# Patient Record
Sex: FEMALE | URBAN - METROPOLITAN AREA
[De-identification: names, ages, dates, MRNs, and addresses within clinical notes are randomized per-mention and may not be internally consistent; named-entity substitution may affect disease eponyms.]

---

## 2019-05-20 ENCOUNTER — APPOINTMENT (OUTPATIENT)
Dept: URBAN - METROPOLITAN AREA SURGERY 8 | Age: 39
Setting detail: DERMATOLOGY
End: 2019-05-20

## 2019-05-20 DIAGNOSIS — R23.1 PALLOR: ICD-10-CM

## 2019-05-20 DIAGNOSIS — L81.4 OTHER MELANIN HYPERPIGMENTATION: ICD-10-CM

## 2019-05-20 DIAGNOSIS — R21 RASH AND OTHER NONSPECIFIC SKIN ERUPTION: ICD-10-CM

## 2019-05-20 PROBLEM — L70.0 ACNE VULGARIS: Status: ACTIVE | Noted: 2019-05-20

## 2019-05-20 PROBLEM — D48.5 NEOPLASM OF UNCERTAIN BEHAVIOR OF SKIN: Status: ACTIVE | Noted: 2019-05-20

## 2019-05-20 PROBLEM — J30.1 ALLERGIC RHINITIS DUE TO POLLEN: Status: ACTIVE | Noted: 2019-05-20

## 2019-05-20 PROBLEM — L55.1 SUNBURN OF SECOND DEGREE: Status: ACTIVE | Noted: 2019-05-20

## 2019-05-20 PROCEDURE — OTHER ORDER TESTS: OTHER

## 2019-05-20 PROCEDURE — OTHER PRESCRIPTION: OTHER

## 2019-05-20 PROCEDURE — OTHER ADDITIONAL NOTES: OTHER

## 2019-05-20 PROCEDURE — 99203 OFFICE O/P NEW LOW 30 MIN: CPT

## 2019-05-20 PROCEDURE — OTHER COUNSELING: OTHER

## 2019-05-20 PROCEDURE — OTHER MIPS QUALITY: OTHER

## 2019-05-20 RX ORDER — DESONIDE 0.5 MG/G
CREAM TOPICAL BID
Qty: 1 | Refills: 0 | Status: ERX | COMMUNITY
Start: 2019-05-20

## 2019-05-20 ASSESSMENT — LOCATION DETAILED DESCRIPTION DERM
LOCATION DETAILED: RIGHT CENTRAL LATERAL NECK
LOCATION DETAILED: LEFT INFERIOR CENTRAL MALAR CHEEK
LOCATION DETAILED: RIGHT INFERIOR CENTRAL MALAR CHEEK

## 2019-05-20 ASSESSMENT — LOCATION SIMPLE DESCRIPTION DERM
LOCATION SIMPLE: NECK
LOCATION SIMPLE: LEFT CHEEK
LOCATION SIMPLE: RIGHT CHEEK

## 2019-05-20 ASSESSMENT — LOCATION ZONE DERM
LOCATION ZONE: NECK
LOCATION ZONE: FACE
LOCATION ZONE: FACE

## 2019-05-20 NOTE — PROCEDURE: COUNSELING
Detail Level: Detailed
Patient Specific Counseling (Will Not Stick From Patient To Patient): Lab work-up as detailed above
Detail Level: Zone

## 2019-05-20 NOTE — PROCEDURE: ADDITIONAL NOTES
Detail Level: Zone
Additional Notes: Facial rash appears to be resolving compared to pictures viewed. Unclear etiology concern for SLE given history (photosensitivity, oral ulcers, Sjogren's-like symptoms, anemia) and livedo reticularis. Discussed biopsy, but unsure if would be diagnostic at this time. Will give desonide cream BID and work-up for SLE and livedo reticularis. Re-assess in 2 weeks. Instructed to call if flares before.
Additional Notes: Will defer and re-asses in 2 weeks at f/u

## 2019-06-04 ENCOUNTER — APPOINTMENT (OUTPATIENT)
Dept: URBAN - METROPOLITAN AREA SURGERY 8 | Age: 39
Setting detail: DERMATOLOGY
End: 2019-06-04

## 2019-06-04 DIAGNOSIS — R21 RASH AND OTHER NONSPECIFIC SKIN ERUPTION: ICD-10-CM

## 2019-06-04 DIAGNOSIS — R23.1 PALLOR: ICD-10-CM

## 2019-06-04 DIAGNOSIS — L81.0 POSTINFLAMMATORY HYPERPIGMENTATION: ICD-10-CM

## 2019-06-04 DIAGNOSIS — D64.9 ANEMIA, UNSPECIFIED: ICD-10-CM

## 2019-06-04 PROCEDURE — 99213 OFFICE O/P EST LOW 20 MIN: CPT

## 2019-06-04 PROCEDURE — OTHER MIPS QUALITY: OTHER

## 2019-06-04 PROCEDURE — OTHER ORDER TESTS: OTHER

## 2019-06-04 PROCEDURE — OTHER ADDITIONAL NOTES: OTHER

## 2019-06-04 PROCEDURE — OTHER REASSURANCE: OTHER

## 2019-06-04 PROCEDURE — OTHER COUNSELING: OTHER

## 2019-06-04 ASSESSMENT — LOCATION DETAILED DESCRIPTION DERM
LOCATION DETAILED: RIGHT INFERIOR CENTRAL MALAR CHEEK
LOCATION DETAILED: LEFT INFERIOR CENTRAL MALAR CHEEK

## 2019-06-04 ASSESSMENT — LOCATION SIMPLE DESCRIPTION DERM
LOCATION SIMPLE: RIGHT CHEEK
LOCATION SIMPLE: LEFT CHEEK

## 2019-06-04 ASSESSMENT — LOCATION ZONE DERM: LOCATION ZONE: FACE

## 2019-06-04 NOTE — PROCEDURE: ADDITIONAL NOTES
Additional Notes: P-ANCA and C-ANCA negative\\ncryoglobulins WNL\\n\\nAntiphospholipid antibody panel ordered but not drawn. Called lab and they will draw today

## 2019-06-04 NOTE — PROCEDURE: ADDITIONAL NOTES
Additional Notes: at last visit, exam revealed erythematous patches on central face, chin. thin bright erythematous plaques with overlying flaky hyperpigmented scale scattered on left and right cheeks: pictures on iPhone reviewed when began and looked more pseudovesicular but difficult to fully assess given quality\\n\\nResolved today. Can continue desonide BID if flares. Instructed to call with new flare so can re-assess and possibly biopsy\\n\\nCBC with microcytic anemia (H/H of 8.4/29.8) and thromobocytosis- has history of iron deficiency anemia. notes she has never had a colonoscopy. previously had heavy menstrual cycles. denies melana, BRBPR, hematemesis\\nUA essentially WNL. no proteinuria\\Yeny WNL\\nSS-A/SS-B ab WNL\\nRF WNL\\nESR elevated at 24\\n\\nWill check CMP as not checked previously and today reports she has had elevations in LFTs and decrease in Cr when previously seen years ago in [place] Additional Notes: at last visit, exam revealed erythematous patches on central face, chin. thin bright erythematous plaques with overlying flaky hyperpigmented scale scattered on left and right cheeks: pictures on iPhone reviewed when began and looked more pseudovesicular but difficult to fully assess given quality\\n\\nResolved today. Can continue desonide BID if flares. Instructed to call with new flare so can re-assess and possibly biopsy\\n\\nCBC with microcytic anemia (H/H of 8.4/29.8) and thromobocytosis- has history of iron deficiency anemia. notes she has never had a colonoscopy. previously had heavy menstrual cycles. denies melana, BRBPR, hematemesis\\nUA essentially WNL. no proteinuria\\Yeny WNL\\nSS-A/SS-B ab WNL\\nRF WNL\\nESR elevated at 24\\n\\nWill check CMP as not checked previously and today reports she has had elevations in LFTs and decrease in Cr when previously seen years ago in [place]

## 2019-06-04 NOTE — PROCEDURE: ADDITIONAL NOTES
Additional Notes: Reports h/o iron deficiency anemia. Will fax most recent CBC to PCP along with copy of notes and other workup. If underlying etiology not determined, could benefit from colonoscopy

## 2021-02-09 ENCOUNTER — APPOINTMENT (OUTPATIENT)
Dept: URBAN - METROPOLITAN AREA SURGERY 8 | Age: 41
Setting detail: DERMATOLOGY
End: 2021-02-10

## 2021-02-09 DIAGNOSIS — D64.9 ANEMIA, UNSPECIFIED: ICD-10-CM

## 2021-02-09 DIAGNOSIS — L65.9 NONSCARRING HAIR LOSS, UNSPECIFIED: ICD-10-CM

## 2021-02-09 DIAGNOSIS — R23.1 PALLOR: ICD-10-CM

## 2021-02-09 PROCEDURE — OTHER ORDER TESTS: OTHER

## 2021-02-09 PROCEDURE — 99214 OFFICE O/P EST MOD 30 MIN: CPT

## 2021-02-09 PROCEDURE — OTHER COUNSELING: OTHER

## 2021-02-09 PROCEDURE — OTHER ADDITIONAL NOTES: OTHER

## 2021-02-09 ASSESSMENT — LOCATION SIMPLE DESCRIPTION DERM
LOCATION SIMPLE: RIGHT KNEE
LOCATION SIMPLE: LEFT PRETIBIAL REGION
LOCATION SIMPLE: ANTERIOR SCALP

## 2021-02-09 ASSESSMENT — LOCATION ZONE DERM
LOCATION ZONE: SCALP
LOCATION ZONE: LEG

## 2021-02-09 ASSESSMENT — LOCATION DETAILED DESCRIPTION DERM
LOCATION DETAILED: LEFT PROXIMAL PRETIBIAL REGION
LOCATION DETAILED: RIGHT KNEE
LOCATION DETAILED: MID-FRONTAL SCALP

## 2021-02-09 NOTE — PROCEDURE: ORDER TESTS
After reviewing your medical history and your lab results, they appear at goal for you! Let us make 2017 a great year! Dr. James Lara M.D.       Good Help to those in Need!!!    s Bill For Surgical Tray: no

## 2021-02-09 NOTE — PROCEDURE: ADDITIONAL NOTES
Additional Notes: favor livedo reticularis given previous presentation on neck in May/June 2019 but workup at that time negative except for microcytic anemia. Iron deficiency anemia favored and referred back to PCP for further evaluation. Has not had colonoscopy. Discussed alopecia could be related, but that hypothyroidism could also be cause. \\n\\nErythema ab igne also in ddx given use of space heater and fact that it has been present for 3 weeks. \\n\\nWill work repeat portion of workup, including adding TSH and iron studies. If still has microcytic anemia, will refer to GI for colonoscopy\\n\\nDiscussed biopsy to confirm livedo vs r/o erythema ab igne, but deferred. Can consider after repeat labs ordered\\n\\nLabs from May/June 2019:\\n-CBC with microcytic anemia (H/H of 8.4/29.8) and thromobocytosis (she notes she has history of iron deficiency anemia)\\n-CMP WNL\\n-ROBBIN negative\\n-SS-A/SS-B ab negative\\n-RF negative\\n-ESR elevated at 24\\n-P-ANCA and C-ANCA negative\\n-cryoglobulins WNL\\n-anti-phosphlipid antibodies negative

## 2021-02-09 NOTE — PROCEDURE: ADDITIONAL NOTES
Additional Notes: last H/H on file of 8.4/29.8. repeat CBC and iron studies. consider colonoscopy based on results